# Patient Record
Sex: MALE | Race: WHITE
[De-identification: names, ages, dates, MRNs, and addresses within clinical notes are randomized per-mention and may not be internally consistent; named-entity substitution may affect disease eponyms.]

---

## 2021-03-02 ENCOUNTER — HOSPITAL ENCOUNTER (EMERGENCY)
Dept: HOSPITAL 47 - EC | Age: 26
Discharge: HOME | End: 2021-03-02
Payer: MEDICARE

## 2021-03-02 VITALS — RESPIRATION RATE: 18 BRPM | HEART RATE: 63 BPM | DIASTOLIC BLOOD PRESSURE: 79 MMHG | SYSTOLIC BLOOD PRESSURE: 124 MMHG

## 2021-03-02 VITALS — TEMPERATURE: 97.6 F

## 2021-03-02 DIAGNOSIS — K92.2: Primary | ICD-10-CM

## 2021-03-02 DIAGNOSIS — F12.90: ICD-10-CM

## 2021-03-02 DIAGNOSIS — F17.200: ICD-10-CM

## 2021-03-02 PROCEDURE — 82272 OCCULT BLD FECES 1-3 TESTS: CPT

## 2021-03-02 PROCEDURE — 99283 EMERGENCY DEPT VISIT LOW MDM: CPT

## 2021-03-02 PROCEDURE — 36415 COLL VENOUS BLD VENIPUNCTURE: CPT

## 2021-03-02 PROCEDURE — 72100 X-RAY EXAM L-S SPINE 2/3 VWS: CPT

## 2021-03-02 NOTE — ED
General Adult HPI





- General


Chief complaint: GI Bleed


Stated complaint: head/back/hip pain


Time Seen by Provider: 03/02/21 14:58


Source: patient


Mode of arrival: wheelchair


Limitations: no limitations





- History of Present Illness


Initial comments: 


Dictation was produced using dragon dictation software. please excuse any 

grammatical, word or spelling errors. 





This patient was cared for during a federal and state declared state of 

emergency secondary to Covid 19





Chief Complaint: 25-year-old male presents to the emergency Department with 

multiple complaints.  He states that he was here for GI bleeding and back pain





History of Present Illness: 25-year-old male who has past medical history of 

ventricular peritoneal shunt placement.  Patient states that he had a discussion

with his primary care physician told him to come to the emergency department to 

be evaluated.  Patient states that he is here because he's been having on and o

ff back pain.  Patient states he has history of chronic back pain and has been 

having back strains on and off for the last several months.  Patient states that

he wears a belt to try to help with his back symptoms.  He does report that he 

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  Patient states he also has a GI 

bleed.  He reports that he is not worried about it.  He has history of chronic 

GI bleeds.  States that he presents from smoking.  Abdominal pain.  Patient 

states back pain is his left low back were swelling.  All his primary care 

physician and told him to come to the emergency department for x-rays.  Patient 

denies any recent trauma.  Denies any rectal pain








The ROS documented in this emergency department record has been reviewed and 

confirmed by me.  Those systems with pertinent positive or negative responses 

have been documented in the HPI.  All other systems are other negative and/or 

noncontributory.








PHYSICAL EXAM:


General Impression: Alert and oriented x3, not in acute distress


HEENT: Normocephalic atraumatic, extra-ocular movements intact, pupils equal and

reactive to light bilaterally, mucous membranes moist.


Cardiovascular: Heart regular rate and rhythm


Chest: Able to complete full sentences, no retractions, no tachypnea


Abdomen: abdomen soft, non-tender, non-distended, no organomegaly


Musculoskeletal: Pulses present and equal in all extremities, no peripheral 

edema


Motor:  no focal deficits noted


Neurological: CN II-XII grossly intact, no focal motor or sensory deficits noted


Skin: Intact with no visualized rashes


Psych: Normal affect and mood


Rectal Exam: No gross blood, no fissures or hemorrhoids





ED course: 25-year-old male presents with instructions by primary care physician

of the emergency department for lumbar spine x-rays.  Patient reports chronic 

back pain.  Denies any recent trauma.  Patient also has secondary complaint of 

GI bleeding which she also reports is chronic.  Vital signs upon arrival are 

within acceptable limits.  Physical examination is benign.  Patient's well-

appearing at bedside.





Patient refusing blood work.  States he only wants x-rays.  Stool occult blood 

was sent.





Stool occult blood is positive..  Is told that he does have blood in his stool. 

States that is chronic however there is some concern that perhaps he could have 

chronic bleed which could result anemia.  Patient still refusing blood draws.  

X-ray shows no acute processes.  Patient reevaluated at bedside at 4:10 PM in 

stable medical condition.  Patient is agreeable for discharge home.  Advised 

follow up with primary care physician.  Patient also given referral to GI 

doctor.

















- Related Data


                                  Previous Rx's











 Medication  Instructions  Recorded


 


Omeprazole 40 mg PO DAILY #24 capsule. 03/02/21











                                    Allergies











Allergy/AdvReac Type Severity Reaction Status Date / Time


 


No Known Allergies Allergy   Verified 03/02/21 15:49














Review of Systems


ROS Statement: 


Those systems with pertinent positive or pertinent negative responses have been 

documented in the HPI.





ROS Other: All systems not noted in ROS Statement are negative.





Past Medical History


Additional Past Medical History / Comment(s): encephalopathy


History of Any Multi-Drug Resistant Organisms: None Reported


Additional Past Surgical History / Comment(s):  shunt placement


Smoking Status: Current every day smoker


Past Alcohol Use History: Occasional


Past Drug Use History: Marijuana





General Exam


Limitations: no limitations





Course


                                   Vital Signs











  03/02/21





  14:40


 


Temperature 97.6 F


 


Pulse Rate 74


 


Respiratory 16





Rate 


 


Blood Pressure 129/80


 


O2 Sat by Pulse 97





Oximetry 














Medical Decision Making





- Lab Data


                                   Lab Results











  03/02/21 Range/Units





  15:20 


 


Stool Occult Blood  Positive  (Negative)  














Disposition


Clinical Impression: 


 GI bleed, Back pain





Disposition: HOME SELF-CARE


Condition: Fair


Instructions (If sedation given, give patient instructions):  Gastrointestinal 

Bleeding (ED), Low Back Strain (ED)


Additional Instructions: 


You have signs of GI bleed on stool occult blood test.  He refused blood so we 

are unable to determine if you are having anemia.  At this point it sounds 

unlikely given you're stable vitals and normal physical exam.  It is important 

that he follow up with her primary care doctor.  Please seek medical attention 

if you develops symptoms involving but not limited to pallor to the skin, 

lightheadedness or generalized weakness.  These could be signs of anemia.


Prescriptions: 


Omeprazole 40 mg PO DAILY #24 capsule.dr


Is patient prescribed a controlled substance at d/c from ED?: No


Referrals: 


Alan Lugo MD [STAFF PHYSICIAN] - 1-2 days


Time of Disposition: 16:11

## 2021-03-02 NOTE — XR
EXAMINATION TYPE: XR lumbar spine 2 or 3V

 

DATE OF EXAM: 3/2/2021

 

COMPARISON: None

 

HISTORY: Chronic pain

 

TECHNIQUE: 3 view lumbar spine

 

FINDINGS: There are 5 lumbar-type vertebral bodies. Pedicles are intact. Disc heights are preserved. 
Vertebral body heights are preserved. Alignment is normal.

 

IMPRESSION:

1.  Normal three-view lumbar spine